# Patient Record
Sex: FEMALE | Race: ASIAN | NOT HISPANIC OR LATINO | Employment: UNEMPLOYED | URBAN - METROPOLITAN AREA
[De-identification: names, ages, dates, MRNs, and addresses within clinical notes are randomized per-mention and may not be internally consistent; named-entity substitution may affect disease eponyms.]

---

## 2019-11-23 ENCOUNTER — HOSPITAL ENCOUNTER (EMERGENCY)
Facility: MEDICAL CENTER | Age: 34
End: 2019-11-23
Attending: EMERGENCY MEDICINE
Payer: MEDICAID

## 2019-11-23 VITALS
TEMPERATURE: 97.3 F | DIASTOLIC BLOOD PRESSURE: 94 MMHG | SYSTOLIC BLOOD PRESSURE: 141 MMHG | BODY MASS INDEX: 30.19 KG/M2 | OXYGEN SATURATION: 97 % | HEART RATE: 65 BPM | RESPIRATION RATE: 16 BRPM | WEIGHT: 181.22 LBS | HEIGHT: 65 IN

## 2019-11-23 DIAGNOSIS — M25.512 ACUTE PAIN OF LEFT SHOULDER: ICD-10-CM

## 2019-11-23 DIAGNOSIS — L02.91 ABSCESS: ICD-10-CM

## 2019-11-23 PROCEDURE — 303485 HCHG DRESSING MEDIUM

## 2019-11-23 PROCEDURE — 96372 THER/PROPH/DIAG INJ SC/IM: CPT

## 2019-11-23 PROCEDURE — 700111 HCHG RX REV CODE 636 W/ 250 OVERRIDE (IP): Performed by: EMERGENCY MEDICINE

## 2019-11-23 PROCEDURE — 700101 HCHG RX REV CODE 250: Performed by: EMERGENCY MEDICINE

## 2019-11-23 PROCEDURE — 303977 HCHG I & D

## 2019-11-23 PROCEDURE — 99283 EMERGENCY DEPT VISIT LOW MDM: CPT

## 2019-11-23 RX ORDER — LIDOCAINE HYDROCHLORIDE AND EPINEPHRINE BITARTRATE 20; .01 MG/ML; MG/ML
10 INJECTION, SOLUTION SUBCUTANEOUS ONCE
Status: COMPLETED | OUTPATIENT
Start: 2019-11-23 | End: 2019-11-23

## 2019-11-23 RX ORDER — KETOROLAC TROMETHAMINE 30 MG/ML
15 INJECTION, SOLUTION INTRAMUSCULAR; INTRAVENOUS ONCE
Status: COMPLETED | OUTPATIENT
Start: 2019-11-23 | End: 2019-11-23

## 2019-11-23 RX ADMIN — LIDOCAINE HYDROCHLORIDE AND EPINEPHRINE 10 ML: 20; 10 INJECTION, SOLUTION INFILTRATION; PERINEURAL at 04:15

## 2019-11-23 RX ADMIN — KETOROLAC TROMETHAMINE 15 MG: 30 INJECTION, SOLUTION INTRAMUSCULAR at 04:08

## 2019-11-23 NOTE — ED TRIAGE NOTES
"Chief Complaint   Patient presents with   • Shoulder Pain     Left shoulder pain, onset today, hx calcific tendinitis   • Abscess     Left anterior abdomen     /94   Pulse 65   Temp 36.3 °C (97.3 °F) (Oral)   Resp 16   Ht 1.651 m (5' 5\")   Wt 82.2 kg (181 lb 3.5 oz)   LMP 11/20/2019   SpO2 97%   BMI 30.16 kg/m²       35 y/o female presents to ED with complaint of abscess to her left anterior torso, onset ~9 days ago. She states she was seen in an ER in [facility] recently and was prescribed abx, which she has been taking as prescribed, but does not recall the name.  Unrelated, she also complains of left shoulder pain onset today. No known trauma or injuries, but does have a hx of calcific tendinitis.  Distal N.V. intact.    Educated on triage process. Placed back in lobby. Advised to notify triage RN with any changes. Apologized for wait times.     "

## 2019-11-23 NOTE — ED NOTES
Patient given DC and follow up instructions. Given wound care instructions. Patient verbalized understanding. Given gauze to take home

## 2019-11-23 NOTE — ED PROVIDER NOTES
ER Provider Note     Scribed for Xavier Geller M.D. by Tracy Ruvalcaba. 11/23/2019, 3:50 AM.    Primary Care Provider: Pcp Pt States None  Means of Arrival: Walk in    History obtained from: Patient  History limited by: None     CHIEF COMPLAINT  Chief Complaint   Patient presents with   • Shoulder Pain     Left shoulder pain, onset today, hx calcific tendinitis   • Abscess     Left anterior abdomen       HPI  Raj Newsome is a 34 y.o. female who presents to the Emergency Department for evaluation of moderate left shoulder pain that began today while at work. Patient states she was seen 3 months ago and diagnosed with calcific tendinitis.     Patient has an additional complaint of pain secondary to an abscess on her left upper abdomen. She was seen in the ER in [place] 6 days ago and given antibiotics and was told to return to the ER if her symptoms persist.     REVIEW OF SYSTEMS  See HPI for further details. All other systems are negative.     PAST MEDICAL HISTORY   History of calcific tendinitis    SURGICAL HISTORY  patient denies any surgical history    SOCIAL HISTORY  Social History     Tobacco Use   • Smoking status: Never Smoker   • Smokeless tobacco: Never Used   Substance Use Topics   • Alcohol use: No   • Drug use: No      Social History     Substance and Sexual Activity   Drug Use No       FAMILY HISTORY  History reviewed. No pertinent family history.    CURRENT MEDICATIONS  No current facility-administered medications on file prior to encounter.      Current Outpatient Medications on File Prior to Encounter   Medication Sig Dispense Refill   • ondansetron (ZOFRAN ODT) 4 MG TBDP Take 1 Tab by mouth every 8 hours as needed for Nausea/Vomiting. 30 Tab 0   • prenatal plus vitamin (STUARTNATAL 1+1) 27-1 MG TABS tablet Take 1 Tab by mouth every day. 30 Tab 0   • potassium chloride SA (K-DUR) 20 MEQ TBCR Take 1 Tab by mouth 2 times a day. 14 Tab 0       ALLERGIES  No Known Allergies    PHYSICAL  "EXAM  VITAL SIGNS: /94   Pulse 65   Temp 36.3 °C (97.3 °F) (Oral)   Resp 16   Ht 1.651 m (5' 5\")   Wt 82.2 kg (181 lb 3.5 oz)   LMP 11/20/2019   SpO2 97%   BMI 30.16 kg/m²    Constitutional: Alert in no apparent distress.  HENT: No signs of trauma, Bilateral external ears normal, Nose normal.   Eyes: Pupils are equal and reactive, Conjunctiva normal, Non-icteric.   Neck: Normal range of motion, No tenderness, Supple, No stridor.   Lymphatic: No lymphadenopathy noted.   Cardiovascular: Regular rate and rhythm, no murmurs.   Thorax & Lungs: Normal breath sounds, No respiratory distress, No wheezing, No chest tenderness.   Abdomen: Bowel sounds normal, Soft, No tenderness, No pulsatile masses. No peritoneal signs. 1 by 3 cm fluctuating mass under left breast.  Skin: Warm, Dry, No erythema, No rash.   Back: No bony tenderness, No CVA tenderness.   Extremities: Intact distal pulses, No edema, No tenderness, No cyanosis.  Musculoskeletal: Good range of motion in all major joints. No tenderness to palpation or major deformities noted. Tenderness to lateral and posterior deltoid. Pain with ROM of left shoulder.   Neurologic: Alert , Normal motor function, Normal sensory function, No focal deficits noted.   Psychiatric: Affect normal, Judgment normal, Mood normal.     DIAGNOSTIC STUDIES / PROCEDURES    Incision and Drainage Procedure    Indication: Abscess    Location: Under left breast     Procedure: The patient was positioned appropriately and the skin over the incision site was prepped with chlorhexidine. Local anesthesia was obtained by infiltration using 2% Lidocaine with epinephrine.  An incision was then made over the center of the lesion and approximately 2 cc of purulent material was expressed. Loculations were not present. The drainage cavity was then dressed with a bandage. The patient’s tetanus status was up to date and did not require a booster dose.    The patient tolerated the procedure " well.    Complications: None    COURSE & MEDICAL DECISION MAKING  Pertinent Labs & Imaging studies reviewed. (See chart for details)    This is a 34 y.o. female that presents left shoulder pain and an abscess under her left breast.  In terms of the patient's shoulder pain do not believe an x-ray is necessary.  I believe this is likely calcific tendinitis given that this is what she has diagnosed with.  I recommend rice therapy.  I will drain the abscess as well.  She is Ivan been on a course of antibiotics and that there is no obvious cellulitis.  I will incise and drain the abscess without treating with antibiotics.    3:50 AM - Patient seen and examined at bedside.  Patient will be medicated with Toradol 15 mg for her symptoms. I let her know I will treat her pain and then we have to drain the abscess. Patient verbalize understanding to the plan of care.     4:01 AM Incision and drainage procedure preformed by me as above.     4:50 AM Informed patient she may be discharged at this time. Patient given wound care and return instructions. Patient verbalize agreement and understanding.     The patient will return for new or worsening symptoms and is stable at the time of discharge.    The patient is referred to a primary physician for blood pressure management, diabetic screening, and for all other preventative health concerns.    DISPOSITION:  Patient will be discharged home in stable condition.    FOLLOW UP:  No follow-up provider specified.    OUTPATIENT MEDICATIONS:  Discharge Medication List as of 11/23/2019  4:39 AM            FINAL IMPRESSION  1. Abscess    2. Acute pain of left shoulder     3.      Incision and drainage procedure preformed by ERP, as above.    Tracy FERNANDES), am scribing for, and in the presence of, Xavier Geller M.D..    Electronically signed by: Tracy Craig), 11/23/2019    Xavier FERNANDES M.D. personally performed the services described in this documentation,  as scribed by Tracy Ruvalcaba in my presence, and it is both accurate and complete.     E    The note accurately reflects work and decisions made by me.  Xavier Geller  11/23/2019  6:32 AM

## 2022-12-01 NOTE — ED NOTES
Checked with patient about possibility of pregnancy, patient states she is currently on her period. Pharmacy notified   Detail Level: Simple Render Risk Assessment In Note?: no Comment: FBE, disc’d acne, pt can continue using otc Differin, instructed to apply nightly to entire face instead of spot treating. F/u on hair loss, pt tolerating oral minoxidil well, pt to continue taking 1.25 mg daily, rfs sent today. Discussed adding Spironolactone for acne and hairloss, pt declines stating she’s taking many pills already. Pt complains of dry feet, recommended urea cream 40% for feet. Dx’d ganglion cyst on L thigh and R ant wrist. All other findings benign. F/u in 1 yr for FBE

## 2023-03-21 ENCOUNTER — APPOINTMENT (OUTPATIENT)
Dept: RADIOLOGY | Facility: MEDICAL CENTER | Age: 38
End: 2023-03-21
Attending: EMERGENCY MEDICINE
Payer: MEDICAID

## 2023-03-21 ENCOUNTER — HOSPITAL ENCOUNTER (EMERGENCY)
Facility: MEDICAL CENTER | Age: 38
End: 2023-03-21
Attending: EMERGENCY MEDICINE
Payer: MEDICAID

## 2023-03-21 VITALS
BODY MASS INDEX: 32.25 KG/M2 | TEMPERATURE: 98.2 F | WEIGHT: 193.56 LBS | OXYGEN SATURATION: 98 % | DIASTOLIC BLOOD PRESSURE: 76 MMHG | SYSTOLIC BLOOD PRESSURE: 118 MMHG | HEART RATE: 62 BPM | HEIGHT: 65 IN | RESPIRATION RATE: 16 BRPM

## 2023-03-21 DIAGNOSIS — L03.311 CELLULITIS OF ABDOMINAL WALL: ICD-10-CM

## 2023-03-21 DIAGNOSIS — R73.9 HYPERGLYCEMIA: ICD-10-CM

## 2023-03-21 DIAGNOSIS — L02.91 ABSCESS: ICD-10-CM

## 2023-03-21 LAB
ANION GAP SERPL CALC-SCNC: 11 MMOL/L (ref 7–16)
BASOPHILS # BLD AUTO: 0.7 % (ref 0–1.8)
BASOPHILS # BLD: 0.1 K/UL (ref 0–0.12)
BUN SERPL-MCNC: 14 MG/DL (ref 8–22)
CALCIUM SERPL-MCNC: 9.6 MG/DL (ref 8.4–10.2)
CHLORIDE SERPL-SCNC: 100 MMOL/L (ref 96–112)
CO2 SERPL-SCNC: 25 MMOL/L (ref 20–33)
CREAT SERPL-MCNC: 0.61 MG/DL (ref 0.5–1.4)
EOSINOPHIL # BLD AUTO: 0.09 K/UL (ref 0–0.51)
EOSINOPHIL NFR BLD: 0.6 % (ref 0–6.9)
ERYTHROCYTE [DISTWIDTH] IN BLOOD BY AUTOMATED COUNT: 41.4 FL (ref 35.9–50)
GFR SERPLBLD CREATININE-BSD FMLA CKD-EPI: 118 ML/MIN/1.73 M 2
GLUCOSE BLD STRIP.AUTO-MCNC: 123 MG/DL (ref 65–99)
GLUCOSE SERPL-MCNC: 186 MG/DL (ref 65–99)
HCT VFR BLD AUTO: 43.9 % (ref 37–47)
HGB BLD-MCNC: 14.6 G/DL (ref 12–16)
IMM GRANULOCYTES # BLD AUTO: 0.08 K/UL (ref 0–0.11)
IMM GRANULOCYTES NFR BLD AUTO: 0.5 % (ref 0–0.9)
LYMPHOCYTES # BLD AUTO: 3.49 K/UL (ref 1–4.8)
LYMPHOCYTES NFR BLD: 23.7 % (ref 22–41)
MCH RBC QN AUTO: 29.3 PG (ref 27–33)
MCHC RBC AUTO-ENTMCNC: 33.3 G/DL (ref 33.6–35)
MCV RBC AUTO: 88.2 FL (ref 81.4–97.8)
MONOCYTES # BLD AUTO: 0.82 K/UL (ref 0–0.85)
MONOCYTES NFR BLD AUTO: 5.6 % (ref 0–13.4)
NEUTROPHILS # BLD AUTO: 10.16 K/UL (ref 2–7.15)
NEUTROPHILS NFR BLD: 68.9 % (ref 44–72)
NRBC # BLD AUTO: 0 K/UL
NRBC BLD-RTO: 0 /100 WBC
PLATELET # BLD AUTO: 393 K/UL (ref 164–446)
PMV BLD AUTO: 9.9 FL (ref 9–12.9)
POTASSIUM SERPL-SCNC: 4.1 MMOL/L (ref 3.6–5.5)
RBC # BLD AUTO: 4.98 M/UL (ref 4.2–5.4)
SODIUM SERPL-SCNC: 136 MMOL/L (ref 135–145)
WBC # BLD AUTO: 14.7 K/UL (ref 4.8–10.8)

## 2023-03-21 PROCEDURE — 71045 X-RAY EXAM CHEST 1 VIEW: CPT

## 2023-03-21 PROCEDURE — 80048 BASIC METABOLIC PNL TOTAL CA: CPT

## 2023-03-21 PROCEDURE — 85025 COMPLETE CBC W/AUTO DIFF WBC: CPT

## 2023-03-21 PROCEDURE — 700111 HCHG RX REV CODE 636 W/ 250 OVERRIDE (IP): Performed by: EMERGENCY MEDICINE

## 2023-03-21 PROCEDURE — 76705 ECHO EXAM OF ABDOMEN: CPT

## 2023-03-21 PROCEDURE — 303977 HCHG I & D

## 2023-03-21 PROCEDURE — 700105 HCHG RX REV CODE 258: Performed by: EMERGENCY MEDICINE

## 2023-03-21 PROCEDURE — 99284 EMERGENCY DEPT VISIT MOD MDM: CPT

## 2023-03-21 PROCEDURE — 36415 COLL VENOUS BLD VENIPUNCTURE: CPT

## 2023-03-21 PROCEDURE — A9270 NON-COVERED ITEM OR SERVICE: HCPCS | Performed by: EMERGENCY MEDICINE

## 2023-03-21 PROCEDURE — 700102 HCHG RX REV CODE 250 W/ 637 OVERRIDE(OP): Performed by: EMERGENCY MEDICINE

## 2023-03-21 PROCEDURE — 82962 GLUCOSE BLOOD TEST: CPT

## 2023-03-21 PROCEDURE — 96374 THER/PROPH/DIAG INJ IV PUSH: CPT | Mod: XU

## 2023-03-21 PROCEDURE — 700101 HCHG RX REV CODE 250: Performed by: EMERGENCY MEDICINE

## 2023-03-21 RX ORDER — CEPHALEXIN 500 MG/1
500 CAPSULE ORAL 4 TIMES DAILY
Qty: 28 CAPSULE | Refills: 0 | Status: SHIPPED | OUTPATIENT
Start: 2023-03-21 | End: 2023-03-28

## 2023-03-21 RX ORDER — SULFAMETHOXAZOLE AND TRIMETHOPRIM 800; 160 MG/1; MG/1
1 TABLET ORAL 2 TIMES DAILY
Qty: 14 TABLET | Refills: 0 | Status: SHIPPED | OUTPATIENT
Start: 2023-03-21 | End: 2023-03-28

## 2023-03-21 RX ORDER — LIDOCAINE HYDROCHLORIDE AND EPINEPHRINE BITARTRATE 20; .01 MG/ML; MG/ML
10 INJECTION, SOLUTION SUBCUTANEOUS ONCE
Status: COMPLETED | OUTPATIENT
Start: 2023-03-21 | End: 2023-03-21

## 2023-03-21 RX ORDER — OXYCODONE AND ACETAMINOPHEN 10; 325 MG/1; MG/1
1 TABLET ORAL ONCE
Status: COMPLETED | OUTPATIENT
Start: 2023-03-21 | End: 2023-03-21

## 2023-03-21 RX ORDER — ONDANSETRON 2 MG/ML
4 INJECTION INTRAMUSCULAR; INTRAVENOUS ONCE
Status: COMPLETED | OUTPATIENT
Start: 2023-03-21 | End: 2023-03-21

## 2023-03-21 RX ORDER — CEPHALEXIN 250 MG/1
500 CAPSULE ORAL ONCE
Status: COMPLETED | OUTPATIENT
Start: 2023-03-21 | End: 2023-03-21

## 2023-03-21 RX ORDER — IBUPROFEN 200 MG
400 TABLET ORAL EVERY 6 HOURS PRN
COMMUNITY

## 2023-03-21 RX ORDER — ONDANSETRON 4 MG/1
4 TABLET, ORALLY DISINTEGRATING ORAL ONCE
Status: COMPLETED | OUTPATIENT
Start: 2023-03-21 | End: 2023-03-21

## 2023-03-21 RX ORDER — OXYCODONE HYDROCHLORIDE AND ACETAMINOPHEN 5; 325 MG/1; MG/1
1 TABLET ORAL EVERY 6 HOURS PRN
Qty: 8 TABLET | Refills: 0 | Status: SHIPPED | OUTPATIENT
Start: 2023-03-21 | End: 2023-03-23

## 2023-03-21 RX ORDER — SULFAMETHOXAZOLE AND TRIMETHOPRIM 800; 160 MG/1; MG/1
1 TABLET ORAL ONCE
Status: COMPLETED | OUTPATIENT
Start: 2023-03-21 | End: 2023-03-21

## 2023-03-21 RX ORDER — SODIUM CHLORIDE 9 MG/ML
1000 INJECTION, SOLUTION INTRAVENOUS ONCE
Status: COMPLETED | OUTPATIENT
Start: 2023-03-21 | End: 2023-03-21

## 2023-03-21 RX ADMIN — LIDOCAINE HYDROCHLORIDE AND EPINEPHRINE 10 ML: 20; 10 INJECTION, SOLUTION INFILTRATION; PERINEURAL at 14:30

## 2023-03-21 RX ADMIN — SULFAMETHOXAZOLE AND TRIMETHOPRIM 1 TABLET: 800; 160 TABLET ORAL at 17:46

## 2023-03-21 RX ADMIN — ONDANSETRON 4 MG: 2 INJECTION INTRAMUSCULAR; INTRAVENOUS at 16:02

## 2023-03-21 RX ADMIN — ONDANSETRON 4 MG: 4 TABLET, ORALLY DISINTEGRATING ORAL at 15:10

## 2023-03-21 RX ADMIN — CEPHALEXIN 500 MG: 250 CAPSULE ORAL at 17:46

## 2023-03-21 RX ADMIN — OXYCODONE AND ACETAMINOPHEN 1 TABLET: 10; 325 TABLET ORAL at 12:47

## 2023-03-21 RX ADMIN — SODIUM CHLORIDE 1000 ML: 9 INJECTION, SOLUTION INTRAVENOUS at 16:02

## 2023-03-21 NOTE — ED TRIAGE NOTES
Chief Complaint   Patient presents with    Lump     Lump under left breast for a long time, removed 3 years ago and now it has returned the last 2 days. Red and painful

## 2023-03-21 NOTE — ED NOTES
Med rec updated and complete, per pt   Allergies reviewed, per pt  Pt reports no prescription medications or vitamins in the last 30 days or longer.  Pt reports no antibiotics in the last 30 days.

## 2023-03-21 NOTE — ED NOTES
"Pt stated that she felt \"weird\" when ask how she felt, Pt informed that it could be the Pn medication, Pt stated that she hasn't drank much water today, however did eat something earlier today - ERP notified;     Pt given ODT zofran; holding antibiotics at this time;    "

## 2023-03-21 NOTE — ED PROVIDER NOTES
"ED Provider Note    CHIEF COMPLAINT  Chief Complaint   Patient presents with    Lump     Lump under left breast for a long time, removed 3 years ago and now it has returned the last 2 days. Red and painful       EXTERNAL RECORDS REVIEWED  Other none    HPI/ROS  LIMITATION TO HISTORY   Select: : None  OUTSIDE HISTORIAN(S):  None    Raj Newsome is a 37 y.o. female who presents with a chief complaint of painful lump underneath her left breast.  Patient reports she had a similar experience approximately 3 years ago while living in [place] and underwent some sort of procedure in order to have the lump removed.  She is unclear what she was diagnosed with at that time.  3 days ago she reports feeling pain in the same area under the left breast after wearing a tight sports bra.  She has tried ibuprofen without improvement.  There is some redness in the area.  She has not had fevers.  She is not immunosuppressed.  She does not use IV drugs.    PAST MEDICAL HISTORY       SURGICAL HISTORY  patient denies any surgical history    FAMILY HISTORY  No family history on file.    SOCIAL HISTORY  Social History     Tobacco Use    Smoking status: Never    Smokeless tobacco: Never   Vaping Use    Vaping Use: Never used   Substance and Sexual Activity    Alcohol use: No    Drug use: No    Sexual activity: Not on file       CURRENT MEDICATIONS  Home Medications       Reviewed by Geetha Lentz R.N. (Registered Nurse) on 03/21/23 at 1146  Med List Status: Not Addressed     Medication Last Dose Status   ondansetron (ZOFRAN ODT) 4 MG TBDP  Active   potassium chloride SA (K-DUR) 20 MEQ TBCR  Active   prenatal plus vitamin (STUARTNATAL 1+1) 27-1 MG TABS tablet  Active                    ALLERGIES  No Known Allergies    PHYSICAL EXAM  VITAL SIGNS: /77   Pulse 68   Temp 36.1 °C (97 °F) (Temporal)   Resp 14   Ht 1.651 m (5' 5\")   Wt 87.8 kg (193 lb 9 oz)   SpO2 98%   BMI 32.21 kg/m²    Physical Exam  Vitals and nursing note " reviewed.   Constitutional:       Appearance: Normal appearance.   HENT:      Head: Normocephalic and atraumatic.      Right Ear: External ear normal.      Left Ear: External ear normal.      Nose: Nose normal.      Mouth/Throat:      Mouth: Mucous membranes are moist.   Eyes:      Extraocular Movements: Extraocular movements intact.      Conjunctiva/sclera: Conjunctivae normal.      Pupils: Pupils are equal, round, and reactive to light.   Cardiovascular:      Rate and Rhythm: Normal rate and regular rhythm.      Pulses: Normal pulses.   Pulmonary:      Effort: Pulmonary effort is normal.      Breath sounds: Normal breath sounds.   Chest:      Chest wall: Tenderness (There is some mild erythema underneath the left breast with associated tenderness but no obvious fluctuance.  There appears to be a surgical incision overlying the center of the erythema.  There is no crepitus.) present.   Abdominal:      Palpations: Abdomen is soft.   Musculoskeletal:         General: Normal range of motion.      Cervical back: Normal range of motion and neck supple.   Skin:     General: Skin is warm and dry.   Neurological:      General: No focal deficit present.      Mental Status: She is alert and oriented to person, place, and time.   Psychiatric:         Mood and Affect: Mood normal.         Behavior: Behavior normal.     DIAGNOSTIC STUDIES / PROCEDURES  LABS  Results for orders placed or performed during the hospital encounter of 03/21/23   CBC WITH DIFFERENTIAL   Result Value Ref Range    WBC 14.7 (H) 4.8 - 10.8 K/uL    RBC 4.98 4.20 - 5.40 M/uL    Hemoglobin 14.6 12.0 - 16.0 g/dL    Hematocrit 43.9 37.0 - 47.0 %    MCV 88.2 81.4 - 97.8 fL    MCH 29.3 27.0 - 33.0 pg    MCHC 33.3 (L) 33.6 - 35.0 g/dL    RDW 41.4 35.9 - 50.0 fL    Platelet Count 393 164 - 446 K/uL    MPV 9.9 9.0 - 12.9 fL    Neutrophils-Polys 68.90 44.00 - 72.00 %    Lymphocytes 23.70 22.00 - 41.00 %    Monocytes 5.60 0.00 - 13.40 %    Eosinophils 0.60 0.00 -  6.90 %    Basophils 0.70 0.00 - 1.80 %    Immature Granulocytes 0.50 0.00 - 0.90 %    Nucleated RBC 0.00 /100 WBC    Neutrophils (Absolute) 10.16 (H) 2.00 - 7.15 K/uL    Lymphs (Absolute) 3.49 1.00 - 4.80 K/uL    Monos (Absolute) 0.82 0.00 - 0.85 K/uL    Eos (Absolute) 0.09 0.00 - 0.51 K/uL    Baso (Absolute) 0.10 0.00 - 0.12 K/uL    Immature Granulocytes (abs) 0.08 0.00 - 0.11 K/uL    NRBC (Absolute) 0.00 K/uL   Basic Metabolic Panel   Result Value Ref Range    Sodium 136 135 - 145 mmol/L    Potassium 4.1 3.6 - 5.5 mmol/L    Chloride 100 96 - 112 mmol/L    Co2 25 20 - 33 mmol/L    Glucose 186 (H) 65 - 99 mg/dL    Bun 14 8 - 22 mg/dL    Creatinine 0.61 0.50 - 1.40 mg/dL    Calcium 9.6 8.4 - 10.2 mg/dL    Anion Gap 11.0 7.0 - 16.0   ESTIMATED GFR   Result Value Ref Range    GFR (CKD-EPI) 118 >60 mL/min/1.73 m 2   POCT glucose device results   Result Value Ref Range    POC Glucose, Blood 123 (H) 65 - 99 mg/dL     RADIOLOGY  I have independently interpreted the diagnostic imaging associated with this visit and am waiting the final reading from the radiologist.   My preliminary interpretation is as follows: Drainable fluid collection with cobblestoning of adjacent subcutaneous tissue.    Radiologist interpretation:   US-ABDOMEN LTD (SOFT TISSUE)   Final Result      Subcutaneous hypoechoic mass which appears to represent a complex fluid collection involving the left anterior chest measuring 3.1 x 2.4 x 1.1 cm in size. This may represent abscess. If this is located in the area of the left breast, evaluation at a    certified breast imaging center with diagnostic mammography and mammographic ultrasound is recommended for further evaluation as breast cancer cannot be excluded.      DX-CHEST-PORTABLE (1 VIEW)   Final Result      No evidence of acute cardiopulmonary process.        INCISION AND DRAINAGE  Verbal consent was obtained. Bedside ultrasound was used to identify the appropriate area for incision. Overlying area was  cleansed with alcohol. Using a 27 gauge needle, approximately 3ccs of 2% lidocaine with epinephrine was injected in the center of the lesion. A small incision was made in the center of the area of erythema where the ultrasound indicated the drainable fluid collection with an 11 blade scalpel. Approximately 5ccs of purulent material was expressed. The cavity was irrigated and packed with iodoform gauze. The patient tolerated the procedure without difficulty and there were no immediate complications.    COURSE & MEDICAL DECISION MAKING    ED Observation Status? Yes; I am placing the patient in to an observation status due to a diagnostic uncertainty as well as therapeutic intensity. Patient placed in observation status at 12:10 PM, 3/21/2023.     Observation plan is as follows: Labs, imaging, incision and drainage, reevaluation    Upon Reevaluation, the patient's condition has: Improved; and will be discharged.    Patient discharged from ED Observation status at 2:43 PM (Time) 3/21/2023 (Date).     INITIAL ASSESSMENT, COURSE AND PLAN  Care Narrative: This is a 37-year-old female with a history of left superior abdominal mass drained in the ER in [place] approximately 3 years ago who is here similar symptoms that started 3 days ago.  Arrives AF with normal VS. Appears well hydrated and non-toxic. She does have tenderness over the left upper abdomen with associated ~4cm area of erythema but no obvious fluctuance or crepitus. Low suspicion for necrotizing fasciitis.  Remainder of the exam is reassuring.  She does not have any known diabetes or immunosuppression.    Given a dose of oral pain medication as she reports 10/10 pain in the erythematous region.     CBC demonstrates leukocytosis to 14.7 with neutrophilic predominance but no bandemia. Metabolic panel reveals normal electrolytes and renal function. Glucose is elevated to 186.    US obtained to evaluate for drainable fluid collection. This did demonstrate a  subcutaneous mass appearing to represent a complex fluid collection measuring 3.1x2.4x1.1cm.  The mass is actually on her abdominal wall and not on her breast, I have a low suspicion that this represents a breast cancer.    The mass was drained as above and about 5ccs of purulence was expressed. The patient tolerated the procedure well but afterwards developed some nausea, shakiness, and dizziness. She was reevaluated at bedside. An accucheck was obtained with normal blood glucose. She was given an oral dose of zofran with a subsequent episode of emesis. An IV was established and she was given zofran and normal saline. I suspect her symptoms are due to the percocet. A CXR was obtained which was reassuring. She was given a dose of bactrim and keflex.    She was allowed to rest and relax during a period of observation and ultimately discharged in good and stable condition with prescriptions for bactrim and keflex. She was given a referral to general surgery and instructed to call tomorrow to schedule a follow up appointment for additional management.     HYDRATION: Based on the patient's presentation of Acute Vomiting the patient was given IV fluids. IV Hydration was used because oral hydration was not adequate alone. Upon recheck following hydration, the patient was improved.    ADDITIONAL PROBLEM LIST  Nausea and vomiting    DISPOSITION AND DISCUSSIONS  I have discussed management of the patient with the following physicians and TR's: None    Discussion of management with other QHP or appropriate source(s): None     Escalation of care considered, and ultimately not performed: Not applicable.    Barriers to care at this time, including but not limited to:  None .     Decision tools and prescription drugs considered including, but not limited to: Antibiotics - bactrim and keflex .    FINAL DIAGNOSIS  1. Abscess    2. Cellulitis of abdominal wall      Electronically signed by: Ismael Alves M.D., 3/21/2023 12:05  PM

## 2023-03-21 NOTE — ED NOTES
"Pt stated \"I have a friend taking me home\" - ERP notified, Pn medication ordered by ERP, Pt medicated;    PIV placed, blood work collected & sent to lab, Pt denied having any needs at this time, no distress noted;  "

## 2023-03-21 NOTE — DISCHARGE INSTRUCTIONS
You were seen in the ER for an abscess of your abdominal wall.  I drained the abscess and has started you on antibiotics, please take these as directed.  I also sent in a prescription for some pain medication to your pharmacy, take it only as directed.  Do not drink alcohol or drive with the medication as it can make you sleepy.  I have placed a referral to general surgery on your behalf but if you have to follow-up in [place] based on your insurance situation and I think waiting 1 month until you return home is acceptable.  Your blood sugar was high today, please follow-up with your primary care physician to discuss this.  You will need fasting lab tests and potentially initiation of medication for diabetes.  Return to the ER immediately if you develop new or worsening symptoms.  I hope you feel better soon!

## 2023-03-22 NOTE — ED NOTES
Given d/c paperwork and RX p/u information; pt verbalized understanding all information given. Pt ambulated out of the ER w/o difficulty w/ family

## 2023-03-22 NOTE — ED NOTES
Pt eyes closed when entered room, woke up when asked how she was doing - stated that she still feels a little dizzy; fluids still infusing;